# Patient Record
Sex: FEMALE | Race: WHITE | URBAN - METROPOLITAN AREA
[De-identification: names, ages, dates, MRNs, and addresses within clinical notes are randomized per-mention and may not be internally consistent; named-entity substitution may affect disease eponyms.]

---

## 2017-01-18 PROBLEM — M81.0 OSTEOPOROSIS: Status: ACTIVE | Noted: 2017-01-18

## 2017-01-18 PROBLEM — C50.912: Status: ACTIVE | Noted: 2017-01-18

## 2017-01-18 PROBLEM — E04.1 THYROID NODULE: Status: ACTIVE | Noted: 2017-01-18

## 2018-01-14 ENCOUNTER — HOSPITAL ENCOUNTER (OUTPATIENT)
Age: 58
Discharge: HOME | End: 2018-01-14
Attending: FAMILY MEDICINE
Payer: COMMERCIAL

## 2018-01-14 VITALS
BODY MASS INDEX: 20.24 KG/M2 | TEMPERATURE: 97 F | DIASTOLIC BLOOD PRESSURE: 54 MMHG | WEIGHT: 90 LBS | OXYGEN SATURATION: 100 % | RESPIRATION RATE: 20 BRPM | SYSTOLIC BLOOD PRESSURE: 132 MMHG | HEART RATE: 64 BPM | HEIGHT: 56 IN

## 2018-01-14 DIAGNOSIS — H81.393 PERIPHERAL VERTIGO OF BOTH EARS: Primary | ICD-10-CM

## 2018-01-14 PROCEDURE — 99204 OFFICE O/P NEW MOD 45 MIN: CPT

## 2018-01-14 PROCEDURE — 99203 OFFICE O/P NEW LOW 30 MIN: CPT

## 2018-01-14 RX ORDER — MECLIZINE HYDROCHLORIDE 25 MG/1
25 TABLET ORAL 3 TIMES DAILY PRN
Qty: 15 TABLET | Refills: 0 | Status: SHIPPED | OUTPATIENT
Start: 2018-01-14 | End: 2018-01-19

## 2018-01-14 NOTE — ED PROVIDER NOTES
Patient presents with:  Dizziness (neurologic)      HPI:     Cezar Carlisle is a 61year old female who presents with for chief complaint of nasal congestion, ear pressure, vertigo  X few days. Patient finished a course of cefdinir for URI 1 week ago. obvious abnormality, atraumatic  Eyes: conjunctivae/corneas clear. PERRL, EOM's intact. Fundi benign. Ears: normal TM's and external ear canals both ears  Nose: no discharge, no sinus tenderness.  No nasal flaring  Throat: Normal oropharynx  Neck: no adeno

## 2018-01-14 NOTE — ED INITIAL ASSESSMENT (HPI)
Patient states feeling dizzy and nauseas since yesterday, states having increased dizziness when standing up. Dizziness subsides when laying flat. Patient also states having intermittent abdominal pain.   Patient states having symptoms of dizziness and na

## 2018-12-18 PROCEDURE — 87624 HPV HI-RISK TYP POOLED RSLT: CPT | Performed by: OBSTETRICS & GYNECOLOGY

## 2018-12-18 PROCEDURE — 88175 CYTOPATH C/V AUTO FLUID REDO: CPT | Performed by: OBSTETRICS & GYNECOLOGY

## 2018-12-19 PROCEDURE — 86003 ALLG SPEC IGE CRUDE XTRC EA: CPT | Performed by: FAMILY MEDICINE
